# Patient Record
Sex: FEMALE | Race: WHITE | NOT HISPANIC OR LATINO | Employment: UNEMPLOYED | URBAN - NONMETROPOLITAN AREA
[De-identification: names, ages, dates, MRNs, and addresses within clinical notes are randomized per-mention and may not be internally consistent; named-entity substitution may affect disease eponyms.]

---

## 2018-09-19 ENCOUNTER — TELEPHONE (OUTPATIENT)
Dept: CARDIOLOGY CLINIC | Facility: CLINIC | Age: 43
End: 2018-09-19

## 2018-09-19 NOTE — TELEPHONE ENCOUNTER
LMOM for John Linker with Dr Es Fierro  To either set up an OV with us to evaluate if EST and Echo are still needed or to see if her PCP could order for her

## 2018-09-19 NOTE — TELEPHONE ENCOUNTER
No   PCP can order  Or, needs another appt with us to review everything before we will order, those tests may no longer be appropriate

## 2018-09-19 NOTE — TELEPHONE ENCOUNTER
[place] Robert was seen 5/22/2017  You ordered an EST and an ECHO  She did not have them done because she broke her foot and couldn't walk on treadmill  She now wants to have them done  Will you put the orders through for them? I put last OV note from [facility] on your desk

## 2019-08-23 RX ORDER — GABAPENTIN 100 MG/1
CAPSULE ORAL
COMMUNITY
Start: 2019-01-22

## 2019-08-23 RX ORDER — ALBUTEROL SULFATE 90 UG/1
AEROSOL, METERED RESPIRATORY (INHALATION)
COMMUNITY
Start: 2019-08-12

## 2019-08-23 RX ORDER — DULOXETIN HYDROCHLORIDE 60 MG/1
60 CAPSULE, DELAYED RELEASE ORAL DAILY
COMMUNITY
Start: 2019-04-16 | End: 2020-04-15

## 2019-08-23 RX ORDER — CYCLOBENZAPRINE HCL 10 MG
TABLET ORAL
COMMUNITY
Start: 2019-05-28

## 2019-08-23 RX ORDER — RANITIDINE 150 MG/1
TABLET ORAL
COMMUNITY
Start: 2019-07-08 | End: 2020-04-22

## 2019-08-23 RX ORDER — ALBUTEROL SULFATE 2.5 MG/3ML
2.5 SOLUTION RESPIRATORY (INHALATION) EVERY 6 HOURS PRN
COMMUNITY
Start: 2018-05-07

## 2019-08-27 ENCOUNTER — OFFICE VISIT (OUTPATIENT)
Dept: GASTROENTEROLOGY | Facility: CLINIC | Age: 44
End: 2019-08-27
Payer: COMMERCIAL

## 2019-08-27 ENCOUNTER — TELEPHONE (OUTPATIENT)
Dept: GASTROENTEROLOGY | Facility: CLINIC | Age: 44
End: 2019-08-27

## 2019-08-27 VITALS — DIASTOLIC BLOOD PRESSURE: 80 MMHG | SYSTOLIC BLOOD PRESSURE: 118 MMHG | WEIGHT: 173 LBS | HEART RATE: 101 BPM

## 2019-08-27 DIAGNOSIS — R10.12 LUQ PAIN: Primary | ICD-10-CM

## 2019-08-27 PROCEDURE — 99203 OFFICE O/P NEW LOW 30 MIN: CPT | Performed by: PHYSICIAN ASSISTANT

## 2019-08-27 RX ORDER — OXYCODONE HYDROCHLORIDE AND ACETAMINOPHEN 5; 325 MG/1; MG/1
1 TABLET ORAL EVERY 6 HOURS PRN
Refills: 0 | COMMUNITY
Start: 2019-08-23

## 2019-08-27 RX ORDER — CETIRIZINE HYDROCHLORIDE 10 MG/1
TABLET ORAL
Refills: 0 | COMMUNITY
Start: 2019-07-08

## 2019-08-27 RX ORDER — PANTOPRAZOLE SODIUM 40 MG/1
40 TABLET, DELAYED RELEASE ORAL DAILY
Qty: 60 TABLET | Refills: 1 | Status: SHIPPED | OUTPATIENT
Start: 2019-08-27 | End: 2019-10-25 | Stop reason: SDUPTHER

## 2019-08-27 NOTE — PROGRESS NOTES
[facility]  Anna Miller 37 y o  female MRN: [id number]       CC: NP to establish after ED visit for LUQ pain    HPI:  Milena Azevedo is a 40-year-old female with history of hyperlipidemia, chronic bronchitis, asthma, hyperlipidemia, cervical radiculopathy and tobacco abuse  Patient is here to follow-up after she was seen in the emergency room at [facility]  Unfortunately, imaging and lab studies are not available to me in Care everywhere  Patient reports that she has been experiencing new onset left upper quadrant pain radiating into her back for several weeks now  She reports no associated pyrosis, regurgitation, dysphagia or odynophagia  She does have mild nausea  Patient reports that she had a CT scan revealing ovarian cysts and an adrenal nodule  Otherwise, patient was told that there was no pathology on CT to explain why she was having left upper quadrant pain  Her primary care provider also send her for chest x-ray that was negative  When asked about NSAID use, the patient reports that she uses 800 milligrams of ibuprofen about twice daily for headache and back pain  She denies melena or hematochezia  Patient has never had an EGD or colonoscopy  Review of Systems:    CONSTITUTIONAL: Denies any fever, chills, or rigors  Good appetite, and no recent weight loss  HEENT: No earache or tinnitus  Denies hearing loss or visual disturbances  CARDIOVASCULAR: No chest pain or palpitations  RESPIRATORY: Denies any cough, hemoptysis, shortness of breath or dyspnea on exertion  GASTROINTESTINAL: As noted in the History of Present Illness  GENITOURINARY: No problems with urination  Denies any hematuria or dysuria  NEUROLOGIC: No dizziness or vertigo, denies headaches  MUSCULOSKELETAL: Denies any muscle or joint pain  SKIN: Denies skin rashes or itching  ENDOCRINE: Denies excessive thirst  Denies intolerance to heat or cold  PSYCHOSOCIAL: Denies depression or anxiety  Denies any recent memory loss  Current Outpatient Medications   Medication Sig Dispense Refill    albuterol (2 5 mg/3 mL) 0 083 % nebulizer solution Inhale 2 5 mg every 6 (six) hours as needed      albuterol (PROVENTIL HFA,VENTOLIN HFA) 90 mcg/act inhaler inhale 1 puff by mouth every 6 hours if needed for wheezing      cetirizine (ZyrTEC) 10 mg tablet   0    cyclobenzaprine (FLEXERIL) 10 mg tablet       DULoxetine (CYMBALTA) 60 mg delayed release capsule Take 60 mg by mouth daily      fluticasone-salmeterol (ADVAIR DISKUS, WIXELA INHUB) 250-50 mcg/dose inhaler Inhale 1 puff 2 (two) times a day      gabapentin (NEURONTIN) 100 mg capsule       ipratropium (ATROVENT) 0 02 % nebulizer solution Inhale every 6 (six) hours as needed      nicotine (NICOTROL) 10 MG inhaler Inhale 1 puff      oxyCODONE-acetaminophen (PERCOCET) 5-325 mg per tablet Take 1 tablet by mouth every 6 (six) hours as needed  0    ranitidine (ZANTAC) 150 mg tablet take 1 tablet by mouth every evening      tiotropium (SPIRIVA) 18 mcg inhalation capsule Place 18 mcg into inhaler and inhale daily      pantoprazole (PROTONIX) 40 mg tablet Take 1 tablet (40 mg total) by mouth daily 30 minutes before breakfast 60 tablet 1     No current facility-administered medications for this visit  Past Medical History:   Diagnosis Date    Asthma     Cervical radiculopathy     COPD (chronic obstructive pulmonary disease) ([facility] [address] )      History reviewed  No pertinent surgical history    Social History     Socioeconomic History    Marital status: Single     Spouse name: None    Number of children: None    Years of education: None    Highest education level: None   Occupational History    None   Social Needs    Financial resource strain: None    Food insecurity:     Worry: None     Inability: None    Transportation needs:     Medical: None     Non-medical: None   Tobacco Use    Smoking status: Current Every Day Smoker     Packs/day: 2 00 Years: 15 00     Pack years: 30 00    Smokeless tobacco: Never Used   Substance and Sexual Activity    Alcohol use: Yes     Frequency: Monthly or less     Drinks per session: 1 or 2    Drug use: Never    Sexual activity: None   Lifestyle    Physical activity:     Days per week: None     Minutes per session: None    Stress: None   Relationships    Social connections:     Talks on phone: None     Gets together: None     Attends [employer]ism service: None     Active member of club or organization: None     Attends meetings of clubs or organizations: None     Relationship status: None    Intimate partner violence:     Fear of current or ex partner: None     Emotionally abused: None     Physically abused: None     Forced sexual activity: None   Other Topics Concern    None   Social History Narrative    None     Family History   Problem Relation Age of Onset    Heart disease Father     Crohn's disease Brother             PHYSICAL EXAM:    Vitals:    08/27/19 0805   BP: 118/80   Pulse: 101   Weight: 78 5 kg (173 lb)     General Appearance:   Alert and oriented x 3  Cooperative, and in no respiratory distress   HEENT:   Normocephalic, atraumatic, anicteric      Neck:  Supple, symmetrical, trachea midline   Lungs:   Clear to auscultation bilaterally    Heart[de-identified]   Regular rate and rhythm   Abdomen:   Soft, obese, epigastric tenderness without guarding or rigidity, non-distended; normal bowel sounds; no masses, no organomegaly    Genitalia:   Deferred    Rectal:   Deferred    Extremities:  No cyanosis, clubbing or edema    Pulses:  2+ and symmetric all extremities    Skin:  Skin color, texture, turgor normal, no rashes or lesions    Lymph nodes:  No palpable cervical or supraclavicular lymphadenopathy        Lab Results:             Invalid input(s): LABALBU            Imaging Studies: I have personally reviewed pertinent imaging studies  Patient was never admitted      ASSESSMENT and PLAN:      1) Left upper quadrant pain - Per patient, she had a negative CT scan  She does not have any overt GERD symptoms  She has mild nausea without vomiting  She does have persistent NSAID use history for several months now for back pain  Her symptoms may be musculoskeletal, PUD +/- H pylori, erosive gastritis, or less likely biliary   - Will contact patient's pulmonologist for clearance for EGD  - Will start patient on pantoprazole 30 minutes before breakfast, she may take Zantac at night  - GERD precautions given and discussed  - Instructed the patient to discontinue NSAIDs, and that she may take Tylenol instead  - If EGD negative, consider GES and RUQ U/S        Follow up after EGD

## 2019-08-27 NOTE — TELEPHONE ENCOUNTER
Please call Dr Watson Sic office for pulmonary clearance for EGD  We are doing EGD for LUQ pain and possible ulcers from NSAID use  She has history of chronic bronchitis and bronchospasm  She was just seen in August, thank you!

## 2019-08-27 NOTE — TELEPHONE ENCOUNTER
Called Dr Tegan Singer office and spoke with Alondra Lucero, she will call patient and schedule an ov with Dr Asiya Youssef

## 2019-09-12 NOTE — TELEPHONE ENCOUNTER
Spoke with patient she had her appt on 9/5 and was cleared she could not pick a date today and would like a call back tomorrow

## 2019-09-20 ENCOUNTER — TELEPHONE (OUTPATIENT)
Dept: GASTROENTEROLOGY | Facility: CLINIC | Age: 44
End: 2019-09-20

## 2019-10-03 ENCOUNTER — LAB REQUISITION (OUTPATIENT)
Dept: LAB | Facility: HOSPITAL | Age: 44
End: 2019-10-03
Payer: COMMERCIAL

## 2019-10-03 DIAGNOSIS — R10.84 GENERALIZED ABDOMINAL PAIN: ICD-10-CM

## 2019-10-03 PROCEDURE — 88305 TISSUE EXAM BY PATHOLOGIST: CPT | Performed by: PATHOLOGY

## 2019-10-24 ENCOUNTER — TELEPHONE (OUTPATIENT)
Dept: GASTROENTEROLOGY | Facility: CLINIC | Age: 44
End: 2019-10-24

## 2019-10-24 DIAGNOSIS — R10.12 LUQ PAIN: ICD-10-CM

## 2019-10-24 NOTE — TELEPHONE ENCOUNTER
Eileen Pickard pt - Pt called and would like a refill of Pantoprazole called into [facility]  Pt states that her dose has changed to twice a day since her EGD and she was diagnosed with Barrets, please call [phone]   Ty

## 2019-10-25 DIAGNOSIS — R10.12 LUQ PAIN: ICD-10-CM

## 2019-10-25 RX ORDER — PANTOPRAZOLE SODIUM 40 MG/1
40 TABLET, DELAYED RELEASE ORAL 2 TIMES DAILY
Qty: 60 TABLET | Refills: 1 | Status: SHIPPED | OUTPATIENT
Start: 2019-10-25 | End: 2020-01-24

## 2020-01-24 DIAGNOSIS — R10.12 LUQ PAIN: ICD-10-CM

## 2020-01-24 RX ORDER — PANTOPRAZOLE SODIUM 40 MG/1
TABLET, DELAYED RELEASE ORAL
Qty: 60 TABLET | Refills: 0 | Status: SHIPPED | OUTPATIENT
Start: 2020-01-24 | End: 2020-02-25

## 2020-02-25 DIAGNOSIS — R10.12 LUQ PAIN: ICD-10-CM

## 2020-02-25 RX ORDER — PANTOPRAZOLE SODIUM 40 MG/1
TABLET, DELAYED RELEASE ORAL
Qty: 60 TABLET | Refills: 0 | Status: SHIPPED | OUTPATIENT
Start: 2020-02-25 | End: 2020-04-10

## 2020-04-10 DIAGNOSIS — R10.12 LUQ PAIN: ICD-10-CM

## 2020-04-10 RX ORDER — PANTOPRAZOLE SODIUM 40 MG/1
40 TABLET, DELAYED RELEASE ORAL 2 TIMES DAILY
Qty: 180 TABLET | Refills: 2 | Status: SHIPPED | OUTPATIENT
Start: 2020-04-10 | End: 2020-04-22

## 2020-04-22 ENCOUNTER — TELEMEDICINE (OUTPATIENT)
Dept: GASTROENTEROLOGY | Facility: CLINIC | Age: 45
End: 2020-04-22
Payer: COMMERCIAL

## 2020-04-22 DIAGNOSIS — K21.9 GASTROESOPHAGEAL REFLUX DISEASE WITHOUT ESOPHAGITIS: Primary | ICD-10-CM

## 2020-04-22 DIAGNOSIS — K22.70 BARRETT'S ESOPHAGUS WITHOUT DYSPLASIA: ICD-10-CM

## 2020-04-22 DIAGNOSIS — R10.12 LUQ PAIN: ICD-10-CM

## 2020-04-22 PROCEDURE — 99214 OFFICE O/P EST MOD 30 MIN: CPT | Performed by: INTERNAL MEDICINE

## 2020-04-22 RX ORDER — PANTOPRAZOLE SODIUM 40 MG/1
40 TABLET, DELAYED RELEASE ORAL DAILY
Qty: 180 TABLET | Refills: 2 | Status: SHIPPED | OUTPATIENT
Start: 2020-04-22 | End: 2021-10-04 | Stop reason: SDUPTHER

## 2020-04-23 ENCOUNTER — TELEPHONE (OUTPATIENT)
Dept: GASTROENTEROLOGY | Facility: CLINIC | Age: 45
End: 2020-04-23

## 2020-04-24 RX ORDER — PANTOPRAZOLE SODIUM 40 MG/1
40 TABLET, DELAYED RELEASE ORAL DAILY
Qty: 90 TABLET | Refills: 3 | Status: SHIPPED | OUTPATIENT
Start: 2020-04-24 | End: 2022-02-20 | Stop reason: SDUPTHER

## 2020-09-09 ENCOUNTER — DOCUMENTATION (OUTPATIENT)
Dept: GASTROENTEROLOGY | Facility: CLINIC | Age: 45
End: 2020-09-09

## 2020-09-09 NOTE — LETTER
9/9/2020    Dear Kavin Mora,    Review of our records shows you are due for the following:    EGD    Please call the following office to schedule your appointment:    [place]    We look forward to hearing from you      Sincerely,    Dr Mariam Hsu

## 2021-05-07 DIAGNOSIS — K21.9 GASTROESOPHAGEAL REFLUX DISEASE WITHOUT ESOPHAGITIS: Primary | ICD-10-CM

## 2021-05-07 RX ORDER — PANTOPRAZOLE SODIUM 40 MG/1
TABLET, DELAYED RELEASE ORAL
Qty: 90 TABLET | Refills: 1 | Status: SHIPPED | OUTPATIENT
Start: 2021-05-07

## 2021-09-17 ENCOUNTER — TELEPHONE (OUTPATIENT)
Dept: NEPHROLOGY | Facility: CLINIC | Age: 46
End: 2021-09-17

## 2021-09-17 NOTE — TELEPHONE ENCOUNTER
Pt called, LM asking if we accept [employer] better health insurance  Called and LM informing pt that we do not accept that insurance  Asked pt to call us back at [phone] if any more questions or concerns

## 2021-10-04 ENCOUNTER — TELEPHONE (OUTPATIENT)
Dept: OTHER | Facility: OTHER | Age: 46
End: 2021-10-04

## 2021-10-04 DIAGNOSIS — K21.9 GASTROESOPHAGEAL REFLUX DISEASE WITHOUT ESOPHAGITIS: ICD-10-CM

## 2021-10-04 DIAGNOSIS — R10.12 LUQ PAIN: ICD-10-CM

## 2021-10-04 RX ORDER — PANTOPRAZOLE SODIUM 40 MG/1
40 TABLET, DELAYED RELEASE ORAL DAILY
Qty: 180 TABLET | Refills: 2 | Status: SHIPPED | OUTPATIENT
Start: 2021-10-04 | End: 2022-02-18 | Stop reason: SDUPTHER

## 2022-02-18 ENCOUNTER — TELEPHONE (OUTPATIENT)
Dept: GASTROENTEROLOGY | Facility: CLINIC | Age: 47
End: 2022-02-18

## 2022-02-18 DIAGNOSIS — R10.12 LUQ PAIN: ICD-10-CM

## 2022-02-18 RX ORDER — PANTOPRAZOLE SODIUM 40 MG/1
40 TABLET, DELAYED RELEASE ORAL DAILY
Qty: 180 TABLET | Refills: 2 | Status: SHIPPED | OUTPATIENT
Start: 2022-02-18

## 2022-02-18 NOTE — TELEPHONE ENCOUNTER
Patient called lmom  for refill pantoprazole 40 mg 1 tablet - called patient back lmom to call office   Does patient take this once or twice daily  Also patient has not been seen since 2020 will need an office visit

## 2022-05-17 ENCOUNTER — EVALUATION (OUTPATIENT)
Dept: PHYSICAL THERAPY | Age: 47
End: 2022-05-17
Payer: COMMERCIAL

## 2022-05-17 DIAGNOSIS — M54.12 CERVICAL RADICULITIS: Primary | ICD-10-CM

## 2022-05-17 DIAGNOSIS — M54.2 NECK PAIN: ICD-10-CM

## 2022-05-17 PROCEDURE — 97110 THERAPEUTIC EXERCISES: CPT | Performed by: PHYSICAL THERAPIST

## 2022-05-17 PROCEDURE — 97162 PT EVAL MOD COMPLEX 30 MIN: CPT | Performed by: PHYSICAL THERAPIST

## 2022-05-17 PROCEDURE — 97140 MANUAL THERAPY 1/> REGIONS: CPT | Performed by: PHYSICAL THERAPIST

## 2022-05-17 NOTE — PROGRESS NOTES
PT Evaluation     Today's date: 2022  Patient name: Consuelo Haro  : 1975  MRN: [id number]  Referring provider: Julianne Brody MD  Dx:   Encounter Diagnosis     ICD-10-CM    1  Cervical radiculitis  M54 12    2  Neck pain  M54 2        Start Time: 745  Stop Time: 45  Total time in clinic (min): 60 minutes    Assessment  Assessment details: Consuelo Haro is a 48 y o  female who presents with pain, decreased strength and decreased ROM  Due to these impairments, Patient has difficulty performing a/iadls and recreational activities  Patient's clinical presentation is consistent with their referring diagnosis of c/s radiculopathy and neck pain  Fair tolerance to PT today  Patient has home traction unit and will bring next visit  Advised postural improvement  Patient would benefit from skilled physical therapy to address their aforementioned impairments, improve their level of function and to improve their overall quality of life  Impairments: abnormal or restricted ROM, activity intolerance, impaired physical strength, lacks appropriate home exercise program, pain with function, poor posture  and poor body mechanics    Symptom irritability: highUnderstanding of Dx/Px/POC: good   Prognosis: good    Goals  ST-3 WEEKS  1  Decrease neck pain < 8/10 on VAS at its worst   2   Increase neck ROM by > 5 deg in all deficients planes  3   Increase LUE strength by 1/2 MMT grade in all deficient planes  4  Decrease radicular symptoms in LUE by 50%  5  Improve postural awareness  LT-6 WEEKS  1  Patient to be independent with a/iadls  2  Increase functional activities for leisure and home activities to previous LOF    3  Independent with HEP and/or fitness program     Plan  Patient would benefit from: skilled physical therapy  Planned modality interventions: cryotherapy, thermotherapy: hydrocollator packs and unattended electrical stimulation  Planned therapy interventions: activity modification, behavior modification, body mechanics training, aquatic therapy, flexibility, functional ROM exercises, home exercise program, IADL retraining, joint mobilization, manual therapy, neuromuscular re-education, patient education, postural training, strengthening, stretching, therapeutic activities and therapeutic exercise  Frequency: 2-3x week  Duration in weeks: 12  Plan of Care beginning date: 2022  Plan of Care expiration date: 2022  Treatment plan discussed with: patient        Subjective Evaluation    History of Present Illness  Mechanism of injury: Patient reports history of c/s radiculopathy for years  Stated it was in her right UE and medication and PT helped in the past   Getting worse  She saw PCP who referred her to ortho  xrays and referral to OPT  She c/o burning and tingling in left distal UE  Recurrent probem    Pain  Current pain ratin  At worst pain rating: 10  Location: neck and LUE  Quality: burning, radiating, discomfort and sharp  Relieving factors: change in position, rest and medications  Progression: worsening    Social Support  Lives with: spouse    Employment status: working (part time)  Hand dominance: right      Diagnostic Tests  X-ray: normal  Treatments  Previous treatment: physical therapy and injection treatment  Patient Goals  Patient goals for therapy: decreased pain, increased motion, increased strength and independence with ADLs/IADLs  Patient goal: no radicular symptoms in LUE        Objective     Static Posture     Head  Forward  Shoulders  Rounded  Thoracic Spine  Hyperkyphosis  Comments  Moderate dowager's hump    Postural Observations  Seated posture: fair  Standing posture: fair  Correction of posture: has no consistent effect        Palpation   Left   Hypertonic in the cervical paraspinals, lower trapezius, middle trapezius, rhomboids, scalenes, sternocleidomastoid, suboccipitals and upper trapezius     Tenderness of the cervical paraspinals, lower trapezius, middle trapezius, rhomboids, scalenes, suboccipitals and upper trapezius  Right   Hypertonic in the cervical paraspinals, lower trapezius, middle trapezius, rhomboids, scalenes, sternocleidomastoid, suboccipitals and upper trapezius  Tenderness of the cervical paraspinals, lower trapezius, middle trapezius, rhomboids, scalenes, suboccipitals and upper trapezius  Neurological Testing     Sensation   Cervical/Thoracic   Left   Intact: light touch    Right   Intact: light touch    Additional Neurological Details  Patient c/o burning and tingling in LUE    Active Range of Motion   Cervical/Thoracic Spine       Cervical    Flexion: 40 (burning LUE) degrees   Extension: 40 degrees     with pain  Left lateral flexion: 20 degrees      Right lateral flexion: 28 degrees      Left rotation: 30 degrees  Right rotation: 30 degrees             Strength/Myotome Testing   Cervical Spine   Neck extension: 3+  Neck flexion: 4-    Left   Neck lateral flexion (C3): 3+    Right   Neck lateral flexion (C3): 3+    Left Shoulder     Planes of Motion   Flexion: 3+   Abduction: 3+   External rotation at 0°: 4-   Internal rotation at 0°: 4+     Right Shoulder     Planes of Motion   Flexion: 4+   Abduction: 4+   External rotation at 0°: 4+   Internal rotation at 0°: 5     Left Elbow   Flexion: 4+  Extension: 4+    Right Elbow   Flexion: 5  Extension: 5    Left Wrist/Hand      (2nd hand position)     Trial 1: 18    Trial 2: 21    Trial 3: 30    Right Wrist/Hand      (2nd hand position)     Trial 1: 40    Trial 2: 45    Trial 3: 48    Tests   Cervical   Positive vertical compression and cervical distraction test     Left   Positive Spurling's Test A and cervical flexion-rotation test       Right   Positive Spurling's Test A and cervical flexion-rotation test      Left Shoulder   Positive cervical rotation lateral flexion  Right Shoulder   Positive cervical rotation lateral flexion       Lumbar   Positive vertical compression        General Comments:    Upper quarter screen   Shoulder: unremarkable  Elbow: unremarkable  Hand/wrist: unremarkable      Flowsheet Rows    Flowsheet Row Most Recent Value   PT/OT G-Codes    Current Score 46   Projected Score 59             Precautions: COPD        Manuals 5/17            neck 15                                                   Neuro Re-Ed                                                                                                        Ther Ex             UBE 2/2            Rows TB             shld ext TB                                                                              Ther Activity                                       Gait Training                                       Modalities             MH 8'            Ice after 8'

## 2022-05-17 NOTE — LETTER
May 17, 2022    Faby Munoz MD  [address] [address]    Patient: David Vásquez   YOB: 1975   Date of Visit: 2022     Encounter Diagnosis     ICD-10-CM    1  Cervical radiculitis  M54 12    2  Neck pain  M54 2        Dear Dr Misty Mcnair:    Thank you for your recent referral of David Vásquez  Please review the attached evaluation summary from Yessica's recent visit  Please verify that you agree with the plan of care by signing the attached order  If you have any questions or concerns, please do not hesitate to call  I sincerely appreciate the opportunity to share in the care of one of your patients and hope to have another opportunity to work with you in the near future  Sincerely,    Lino Boston, PT      Referring Provider:      I certify that I have read the below Plan of Care and certify the need for these services furnished under this plan of treatment while under my care  Faby Munoz MD  [address] [address]  Via Fax: [phone]          PT Evaluation     Today's date: 2022  Patient name: David Vásquez  : 1975  MRN: [id number]  Referring provider: Gurinder Alberto MD  Dx:   Encounter Diagnosis     ICD-10-CM    1  Cervical radiculitis  M54 12    2  Neck pain  M54 2        Start Time: 745  Stop Time: 845  Total time in clinic (min): 60 minutes    Assessment  Assessment details: David Vásquez is a 48 y o  female who presents with pain, decreased strength and decreased ROM  Due to these impairments, Patient has difficulty performing a/iadls and recreational activities  Patient's clinical presentation is consistent with their referring diagnosis of c/s radiculopathy and neck pain  Fair tolerance to PT today  Patient has home traction unit and will bring next visit  Advised postural improvement    Patient would benefit from skilled physical therapy to address their aforementioned impairments, improve their level of function and to improve their overall quality of life  Impairments: abnormal or restricted ROM, activity intolerance, impaired physical strength, lacks appropriate home exercise program, pain with function, poor posture  and poor body mechanics    Symptom irritability: highUnderstanding of Dx/Px/POC: good   Prognosis: good    Goals  ST-3 WEEKS  1  Decrease neck pain < 8/10 on VAS at its worst   2   Increase neck ROM by > 5 deg in all deficients planes  3   Increase LUE strength by 1/2 MMT grade in all deficient planes  4  Decrease radicular symptoms in LUE by 50%  5  Improve postural awareness  LT-6 WEEKS  1  Patient to be independent with a/iadls  2  Increase functional activities for leisure and home activities to previous LOF  3  Independent with HEP and/or fitness program     Plan  Patient would benefit from: skilled physical therapy  Planned modality interventions: cryotherapy, thermotherapy: hydrocollator packs and unattended electrical stimulation  Planned therapy interventions: activity modification, behavior modification, body mechanics training, aquatic therapy, flexibility, functional ROM exercises, home exercise program, IADL retraining, joint mobilization, manual therapy, neuromuscular re-education, patient education, postural training, strengthening, stretching, therapeutic activities and therapeutic exercise  Frequency: 2-3x week  Duration in weeks: 12  Plan of Care beginning date: 2022  Plan of Care expiration date: 2022  Treatment plan discussed with: patient        Subjective Evaluation    History of Present Illness  Mechanism of injury: Patient reports history of c/s radiculopathy for years  Stated it was in her right UE and medication and PT helped in the past   Getting worse  She saw PCP who referred her to ortho  xrays and referral to OPT  She c/o burning and tingling in left distal UE             Recurrent probem    Pain  Current pain ratin  At worst pain rating: 10  Location: neck and LUE  Quality: burning, radiating, discomfort and sharp  Relieving factors: change in position, rest and medications  Progression: worsening    Social Support  Lives with: spouse    Employment status: working (part time)  Hand dominance: right      Diagnostic Tests  X-ray: normal  Treatments  Previous treatment: physical therapy and injection treatment  Patient Goals  Patient goals for therapy: decreased pain, increased motion, increased strength and independence with ADLs/IADLs  Patient goal: no radicular symptoms in LUE        Objective     Static Posture     Head  Forward  Shoulders  Rounded  Thoracic Spine  Hyperkyphosis  Comments  Moderate dowager's hump    Postural Observations  Seated posture: fair  Standing posture: fair  Correction of posture: has no consistent effect        Palpation   Left   Hypertonic in the cervical paraspinals, lower trapezius, middle trapezius, rhomboids, scalenes, sternocleidomastoid, suboccipitals and upper trapezius  Tenderness of the cervical paraspinals, lower trapezius, middle trapezius, rhomboids, scalenes, suboccipitals and upper trapezius  Right   Hypertonic in the cervical paraspinals, lower trapezius, middle trapezius, rhomboids, scalenes, sternocleidomastoid, suboccipitals and upper trapezius  Tenderness of the cervical paraspinals, lower trapezius, middle trapezius, rhomboids, scalenes, suboccipitals and upper trapezius       Neurological Testing     Sensation   Cervical/Thoracic   Left   Intact: light touch    Right   Intact: light touch    Additional Neurological Details  Patient c/o burning and tingling in LUE    Active Range of Motion   Cervical/Thoracic Spine       Cervical    Flexion: 40 (burning LUE) degrees   Extension: 40 degrees     with pain  Left lateral flexion: 20 degrees      Right lateral flexion: 28 degrees      Left rotation: 30 degrees  Right rotation: 30 degrees             Strength/Myotome Testing   Cervical Spine   Neck extension: 3+  Neck flexion: 4-    Left   Neck lateral flexion (C3): 3+    Right   Neck lateral flexion (C3): 3+    Left Shoulder     Planes of Motion   Flexion: 3+   Abduction: 3+   External rotation at 0°: 4-   Internal rotation at 0°: 4+     Right Shoulder     Planes of Motion   Flexion: 4+   Abduction: 4+   External rotation at 0°: 4+   Internal rotation at 0°: 5     Left Elbow   Flexion: 4+  Extension: 4+    Right Elbow   Flexion: 5  Extension: 5    Left Wrist/Hand      (2nd hand position)     Trial 1: 18    Trial 2: 21    Trial 3: 30    Right Wrist/Hand      (2nd hand position)     Trial 1: 40    Trial 2: 45    Trial 3: 48    Tests   Cervical   Positive vertical compression and cervical distraction test     Left   Positive Spurling's Test A and cervical flexion-rotation test       Right   Positive Spurling's Test A and cervical flexion-rotation test      Left Shoulder   Positive cervical rotation lateral flexion  Right Shoulder   Positive cervical rotation lateral flexion  Lumbar   Positive vertical compression        General Comments:    Upper quarter screen   Shoulder: unremarkable  Elbow: unremarkable  Hand/wrist: unremarkable      Flowsheet Rows    Flowsheet Row Most Recent Value   PT/OT G-Codes    Current Score 46   Projected Score 59             Precautions: COPD        Manuals 5/17            neck 15                                                   Neuro Re-Ed                                                                                                        Ther Ex             UBE 2/2            Rows TB             shld ext TB                                                                              Ther Activity                                       Gait Training                                       Modalities             MH 8'            Ice after 8'

## 2022-05-19 ENCOUNTER — OFFICE VISIT (OUTPATIENT)
Dept: PHYSICAL THERAPY | Age: 47
End: 2022-05-19
Payer: COMMERCIAL

## 2022-05-19 DIAGNOSIS — M54.12 CERVICAL RADICULITIS: Primary | ICD-10-CM

## 2022-05-19 DIAGNOSIS — M54.2 NECK PAIN: ICD-10-CM

## 2022-05-19 PROCEDURE — 97110 THERAPEUTIC EXERCISES: CPT

## 2022-05-19 PROCEDURE — 97140 MANUAL THERAPY 1/> REGIONS: CPT

## 2022-05-24 ENCOUNTER — APPOINTMENT (OUTPATIENT)
Dept: PHYSICAL THERAPY | Age: 47
End: 2022-05-24
Payer: COMMERCIAL

## 2022-05-26 ENCOUNTER — OFFICE VISIT (OUTPATIENT)
Dept: PHYSICAL THERAPY | Age: 47
End: 2022-05-26
Payer: COMMERCIAL

## 2022-05-26 DIAGNOSIS — M54.12 CERVICAL RADICULITIS: Primary | ICD-10-CM

## 2022-05-26 DIAGNOSIS — M54.2 NECK PAIN: ICD-10-CM

## 2022-05-26 PROCEDURE — 97140 MANUAL THERAPY 1/> REGIONS: CPT | Performed by: PHYSICAL THERAPIST

## 2022-05-26 PROCEDURE — 97110 THERAPEUTIC EXERCISES: CPT | Performed by: PHYSICAL THERAPIST

## 2022-05-26 NOTE — PROGRESS NOTES
Daily Note     Today's date: 2022  Patient name: Alejandro Ocasio  : 1975  MRN: [id number]  Referring provider: Jos Hi MD  Dx:   Encounter Diagnosis     ICD-10-CM    1  Cervical radiculitis  M54 12    2  Neck pain  M54 2        Start Time: 0700  Stop Time: 0745  Total time in clinic (min): 45 minutes    Subjective: Patient has no changes, c/o       Objective: See treatment diary below      Assessment: Tolerated treatment well  Patient would benefit from continued PT  Left shoulder tight end range with pain, continues with tingling in left distal UE  Advised to stretch pecs, posture with upright and shoulder retraction, and TB exercises  Plan: Continue per plan of care        Precautions: COPD        Manuals           neck 15 15 15                                                 Neuro Re-Ed                                                                                                        Ther Ex             UBE / 3/3           Rows TB  Red 2x10 3x10          shld ext TB  Red 2x10 3x10          Cane CP+   30x          Shld stab   30x                                                 Ther Activity                                       Gait Training                                       Modalities              8' 8'           Ice after 8' deferred

## 2022-05-31 ENCOUNTER — OFFICE VISIT (OUTPATIENT)
Dept: PHYSICAL THERAPY | Age: 47
End: 2022-05-31
Payer: COMMERCIAL

## 2022-05-31 DIAGNOSIS — M54.12 CERVICAL RADICULITIS: Primary | ICD-10-CM

## 2022-05-31 DIAGNOSIS — M54.2 NECK PAIN: ICD-10-CM

## 2022-05-31 PROCEDURE — 97140 MANUAL THERAPY 1/> REGIONS: CPT | Performed by: PHYSICAL THERAPIST

## 2022-05-31 PROCEDURE — 97110 THERAPEUTIC EXERCISES: CPT | Performed by: PHYSICAL THERAPIST

## 2022-05-31 NOTE — PROGRESS NOTES
Daily Note     Today's date: 2022  Patient name: Fuentes Santiago  : 1975  MRN: [id number]  Referring provider: Cheri Herrera MD  Dx:   Encounter Diagnosis     ICD-10-CM    1  Cervical radiculitis  M54 12    2  Neck pain  M54 2        Start Time:   Stop Time:   Total time in clinic (min): 45 minutes    Subjective: Patient reports the numbness in LUE is constant  Patent reports she did some swimming over weekend and her neck bothered her  Objective: See treatment diary below      Assessment: Tolerated treatment well  Patient would benefit from continued PT  Increased program with good tolerance  Plan: Continue per plan of care        Precautions: COPD        Manuals          Neck, 15 15 15 10         L UE nerve glides    5                                   Neuro Re-Ed                                                                                                        Ther Ex             UBE 2/2 3/3  3/3         Rows TB  Red 2x10 3x10 30x         shld ext TB  Red 2x10 3x10 30x         Cane CP+   30x 2# 30x         Shld stab   30x 2# 30x         Posture rows    10x red         Chin tucks    15x         Ball circles on wall L UE    30x ea         Bicep TB    Red 30x         tricep                          Ther Activity                                       Gait Training                                       Modalities              8' 8' nt home         Ice after 8' deferred

## 2022-06-02 ENCOUNTER — OFFICE VISIT (OUTPATIENT)
Dept: PHYSICAL THERAPY | Age: 47
End: 2022-06-02
Payer: COMMERCIAL

## 2022-06-02 DIAGNOSIS — M54.12 CERVICAL RADICULITIS: Primary | ICD-10-CM

## 2022-06-02 DIAGNOSIS — M54.2 NECK PAIN: ICD-10-CM

## 2022-06-02 PROCEDURE — 97140 MANUAL THERAPY 1/> REGIONS: CPT | Performed by: PHYSICAL THERAPIST

## 2022-06-02 PROCEDURE — 97012 MECHANICAL TRACTION THERAPY: CPT | Performed by: PHYSICAL THERAPIST

## 2022-06-02 PROCEDURE — 97110 THERAPEUTIC EXERCISES: CPT | Performed by: PHYSICAL THERAPIST

## 2022-06-02 NOTE — PROGRESS NOTES
Daily Note     Today's date: 2022  Patient name: Shabbir Harding  : 1975  MRN: [id number]  Referring provider: Ruthie Woodall MD  Dx:   Encounter Diagnosis     ICD-10-CM    1  Cervical radiculitis  M54 12    2  Neck pain  M54 2        Start Time: 0700  Stop Time: 0800  Total time in clinic (min): 60 minutes    Subjective: Patient reports she woke up with worse numbness in left distal UE medial palm and forearm  Objective: See treatment diary below      Assessment: Tolerated treatment well  Patient would benefit from continued PT  Initiated mechanical traction and nerve glides today  Patient felt better after session  Plan: Continue per plan of care        Precautions: COPD        Manuals         Neck, 15 15 15 10 15        L UE nerve glides    5 5        flossing     20x        mech traction static c/s     13# 10'        Neuro Re-Ed                                                                                                        Ther Ex             UBE 2/2 3/3  3/3 3/3        Rows TB  Red 2x10 3x10 30x Blue 30x        shld ext TB  Red 2x10 3x10 30x 30x blue        Cane CP+   30x 2# 30x 1# 30x        Shld stab   30x 2# 30x 1# 30x        Posture rows    10x red 20x        Chin tucks    15x         Ball circles on wall L UE    30x ea 30x ea        Bicep TB    Red 30x 30x blue        tricep                          Ther Activity                                       Gait Training                                       Modalities              8' 8' nt home         Ice after 8' deferred

## 2022-06-07 ENCOUNTER — APPOINTMENT (OUTPATIENT)
Dept: PHYSICAL THERAPY | Age: 47
End: 2022-06-07
Payer: COMMERCIAL

## 2022-06-07 NOTE — PROGRESS NOTES
Daily Note     Today's date: 2022  Patient name: Helen Thomas  : 1975  MRN: [id number]  Referring provider: Patrice Lam MD  Dx:   Encounter Diagnosis     ICD-10-CM    1  Cervical radiculitis  M54 12    2  Neck pain  M54 2                   Subjective: ***      Objective: See treatment diary below      Assessment: Tolerated treatment {Tolerated treatment :8377230698}   Patient {assessment:0185080078}      Plan: {PLAN:0228144428}     Precautions: COPD        Manuals  6        Neck, 15 15 15 10 15        L UE nerve glides    5 5        flossing     20x        mech traction static c/s     13# 10'        Neuro Re-Ed                                                                                                        Ther Ex             UBE 2/2 3/3  3/3 3/3        Rows TB  Red 2x10 3x10 30x Blue 30x        shld ext TB  Red 2x10 3x10 30x 30x blue        Cane CP+   30x 2# 30x 1# 30x        Shld stab   30x 2# 30x 1# 30x        Posture rows    10x red 20x        Chin tucks    15x         Ball circles on wall L UE    30x ea 30x ea        Bicep TB    Red 30x 30x blue        tricep                          Ther Activity                                       Gait Training                                       Modalities             MH 8' 8' nt home         Ice after 8' deferred

## 2022-06-08 ENCOUNTER — OFFICE VISIT (OUTPATIENT)
Dept: PHYSICAL THERAPY | Age: 47
End: 2022-06-08
Payer: COMMERCIAL

## 2022-06-08 DIAGNOSIS — M54.12 CERVICAL RADICULITIS: Primary | ICD-10-CM

## 2022-06-08 DIAGNOSIS — M54.2 NECK PAIN: ICD-10-CM

## 2022-06-08 PROCEDURE — 97140 MANUAL THERAPY 1/> REGIONS: CPT

## 2022-06-08 PROCEDURE — 97110 THERAPEUTIC EXERCISES: CPT

## 2022-06-08 PROCEDURE — 97012 MECHANICAL TRACTION THERAPY: CPT

## 2022-06-08 NOTE — PROGRESS NOTES
Daily Note     Today's date: 2022  Patient name: David Vásquez  : 1975  MRN: [id number]  Referring provider: Gurinder Alberto MD  Dx:   Encounter Diagnosis     ICD-10-CM    1  Cervical radiculitis  M54 12    2  Neck pain  M54 2        Start Time: 815  Stop Time: 908  Total time in clinic (min): 53 minutes    Subjective: Reports constant numbness down LUE and entire hand upon arrival        Objective: See treatment diary below      Assessment: Tolerated treatment fair  Decreased tband resistance today to red secondary to c/o radicular symptoms and numbness  Complains of increased numbness and difficulty gripping handle for tband rows and upright rows post session  Instructed to hold these exercises if symptoms continue to worsen when performing at home and will monitor next session to see if modifications need to be made  Patient did need extra cues for proper form when performing upright rows and was instructed to decrease ROM to ensure proper form was maintained  Relief of LUE radicular symptoms with L UT stretching and manual traction, but symptoms return when returning to neutral  Decreased hand numbness, reported only at digits 4 & 5 post session  Patient would benefit from continued PT  Plan: Continue per plan of care        Precautions: COPD        Manuals  6       Neck, 15 15 15 10 15 15'       L UE nerve glides    5 5 4'       flossing     20x 20x       mech traction static c/s     13# 10' 13# 10'       Neuro Re-Ed                                                                                                        Ther Ex             UBE 2/2 3/3  3/3 3/3 3/3       Rows TB  Red 2x10 3x10 30x Blue 30x 30x red       shld ext TB  Red 2x10 3x10 30x 30x blue 30x red        Cane CP+   30x 2# 30x 1# 30x 1# 30x        Shld stab   30x 2# 30x 1# 30x        Posture rows    10x red 20x 20x red        Chin tucks    15x         Ball circles on wall L UE    30x ea 30x ea 30x ea Bicep TB    Red 30x 30x blue 30x        tricep                          Ther Activity                                       Gait Training                                       Modalities             MH 8' 8' nt home         Ice after 8' deferred

## 2022-06-09 ENCOUNTER — OFFICE VISIT (OUTPATIENT)
Dept: PHYSICAL THERAPY | Age: 47
End: 2022-06-09
Payer: COMMERCIAL

## 2022-06-09 DIAGNOSIS — M54.2 NECK PAIN: ICD-10-CM

## 2022-06-09 DIAGNOSIS — M54.12 CERVICAL RADICULITIS: Primary | ICD-10-CM

## 2022-06-09 PROCEDURE — 97110 THERAPEUTIC EXERCISES: CPT | Performed by: PHYSICAL THERAPIST

## 2022-06-09 PROCEDURE — 97140 MANUAL THERAPY 1/> REGIONS: CPT | Performed by: PHYSICAL THERAPIST

## 2022-06-09 NOTE — PROGRESS NOTES
Daily Note     Today's date: 2022  Patient name: Zakiya Jane  : 1975  MRN: [id number]  Referring provider: Yony Isaacs MD  Dx:   Encounter Diagnosis     ICD-10-CM    1  Cervical radiculitis  M54 12    2  Neck pain  M54 2        Start Time: 3583  Stop Time: 0740  Total time in clinic (min): 45 minutes    Subjective: patient reports she notices relief of left UE symptoms while on traction but as soon as releases they come back  Objective: See treatment diary below      Assessment: Tolerated treatment well  Patient would benefit from continued PT  Patient gets pain with left shoulder flexion and nerve glides movements  She had a home traction machine but unable to locate it  Plan: Continue per plan of care        Precautions: COPD        Manuals       Neck, 15 15 15 10 15 15' 10      L UE nerve glides    5 5 4' 5      flossing     20x 20x       mech traction static c/s     13# 10' 13# 10' 14# 30x      Neuro Re-Ed                                                                                                        Ther Ex             UBE /2 3/3  3/3 3/3 3/3 3/3       Rows TB  Red 2x10 3x10 30x Blue 30x 30x red 30x      shld ext TB  Red 2x10 3x10 30x 30x blue 30x red  30x      Cane CP+   30x 2# 30x 1# 30x 1# 30x  2# 30x      Shld stab   30x 2# 30x 1# 30x  2# 30x      Posture rows    10x red 20x 20x red  20x      Chin tucks    15x         Ball circles on wall L UE    30x ea 30x ea 30x ea 30x      Bicep TB    Red 30x 30x blue 30x  30x      tricep                          Ther Activity                                       Gait Training                                       Modalities             MH 8' 8' nt home         Ice after 8' deferred

## 2022-06-13 ENCOUNTER — OFFICE VISIT (OUTPATIENT)
Dept: PHYSICAL THERAPY | Age: 47
End: 2022-06-13
Payer: COMMERCIAL

## 2022-06-13 DIAGNOSIS — M54.2 NECK PAIN: ICD-10-CM

## 2022-06-13 DIAGNOSIS — M54.12 CERVICAL RADICULITIS: Primary | ICD-10-CM

## 2022-06-13 PROCEDURE — 97110 THERAPEUTIC EXERCISES: CPT | Performed by: PHYSICAL THERAPIST

## 2022-06-13 PROCEDURE — 97140 MANUAL THERAPY 1/> REGIONS: CPT | Performed by: PHYSICAL THERAPIST

## 2022-06-13 PROCEDURE — 97012 MECHANICAL TRACTION THERAPY: CPT | Performed by: PHYSICAL THERAPIST

## 2022-06-13 NOTE — PROGRESS NOTES
Daily Note     Today's date: 2022  Patient name: Laura Aiken  : 1975  MRN: [id number]  Referring provider: Camilla Biswas MD  Dx:   Encounter Diagnosis     ICD-10-CM    1  Cervical radiculitis  M54 12    2  Neck pain  M54 2        Start Time: 0700  Stop Time: 3165  Total time in clinic (min): 55 minutes    Subjective: Patient reports she still gets burning in left medial forearm and 3-5th digits  She is seeing MD on Wednesday  Objective: See treatment diary below      Assessment: Tolerated treatment well  Patient would benefit from continued PT  Traction does not relieve her symptoms yet  She states constant burning with no relief  Plan: Continue per plan of care        Precautions: COPD        Manuals      Neck, 15 15 15 10 15 15' 10 10     L UE nerve glides    5 5 4' 5 5     flossing     20x 20x       mech traction static c/s     13# 10' 13# 10' 14# 30x 15# 30x     Neuro Re-Ed                                                                                                        Ther Ex             UBE /2 3/3  3/3 3/3 3/3 3/3  3/3     Rows TB  Red 2x10 3x10 30x Blue 30x 30x red 30x 30x     shld ext TB  Red 2x10 3x10 30x 30x blue 30x red  30x 30x     Cane CP+   30x 2# 30x 1# 30x 1# 30x  2# 30x 2# 30x     Shld stab   30x 2# 30x 1# 30x  2# 30x 2# 30x     Posture rows    10x red 20x 20x red  20x 30x     Chin tucks    15x         Ball circles on wall L UE    30x ea 30x ea 30x ea 30x 30x     Bicep TB    Red 30x 30x blue 30x  30x 30x     tricep TB        30x red                  Ther Activity                                       Gait Training                                       Modalities              8' 8' nt home         Ice after 8' deferred

## 2022-06-16 ENCOUNTER — OFFICE VISIT (OUTPATIENT)
Dept: PHYSICAL THERAPY | Age: 47
End: 2022-06-16
Payer: COMMERCIAL

## 2022-06-16 DIAGNOSIS — M54.2 NECK PAIN: ICD-10-CM

## 2022-06-16 DIAGNOSIS — M54.12 CERVICAL RADICULITIS: Primary | ICD-10-CM

## 2022-06-16 PROCEDURE — 97140 MANUAL THERAPY 1/> REGIONS: CPT | Performed by: PHYSICAL THERAPIST

## 2022-06-16 PROCEDURE — 97110 THERAPEUTIC EXERCISES: CPT | Performed by: PHYSICAL THERAPIST

## 2022-06-16 NOTE — PROGRESS NOTES
Daily Note     Today's date: 2022  Patient name: Puma Chávez  : 1975  MRN: [id number]  Referring provider: Jayla Davenport MD  Dx:   Encounter Diagnosis     ICD-10-CM    1  Cervical radiculitis  M54 12    2  Neck pain  M54 2        Start Time: 630  Stop Time: 0745  Total time in clinic (min): 75 minutes    Subjective: Patient reports she is doing HEP and asked for heavier TB and to do RTC exercises  Patient reports she is to have MRI of her neck but appt not made yet  Objective: See treatment diary below      Assessment: Tolerated treatment well  Patient would benefit from continued PT patient compliant with HEP  Green and blue TB given  Copy given Med bridge access code: [email or web address]  She c/o pain and burning in LUE  Re-evaluation next visit  Plan: Continue per plan of care        Precautions: COPD        Manuals  6 6 6    Neck, 15 15 15 10 15 15' 10 10 10    L UE nerve glides    5 5 4' 5 5     flossing     20x 20x       mech traction static c/s     13# 10' 13# 10' 14#  15#  16# 10'    Neuro Re-Ed                                                                                           HEP         2'    Ther Ex             UBE 2/2 33  33 33 33 33  33 3/3    Rows TB  Red 2x10 3x10 30x Blue 30x 30x red 30x 30x 30x    shld ext TB  Red 2x10 3x10 30x 30x blue 30x red  30x 30x 30x    Cane CP+   30x 2# 30x 1# 30x 1# 30x  2# 30x 2# 30x 2# 30x    Shld stab   30x 2# 30x 1# 30x  2# 30x 2# 30x 2# 30x    Posture rows    10x red 20x 20x red  20x 30x 30x    Chin tucks    15x         Ball circles on wall L UE    30x ea 30x ea 30x ea 30x 30x 30x    Bicep TB    Red 30x 30x blue 30x  30x 30x 30x    tricep TB        30x red 30x    IR RTC L         30x greeen    ER RTC L         30x green    Ther Activity                                       Gait Training                                       Modalities             MH 8' 8' nt home         Ice after 8' deferred

## 2022-06-20 ENCOUNTER — EVALUATION (OUTPATIENT)
Dept: PHYSICAL THERAPY | Age: 47
End: 2022-06-20
Payer: COMMERCIAL

## 2022-06-20 DIAGNOSIS — M54.2 NECK PAIN: ICD-10-CM

## 2022-06-20 DIAGNOSIS — M54.12 CERVICAL RADICULITIS: Primary | ICD-10-CM

## 2022-06-20 PROCEDURE — 97140 MANUAL THERAPY 1/> REGIONS: CPT | Performed by: PHYSICAL THERAPIST

## 2022-06-20 PROCEDURE — 97110 THERAPEUTIC EXERCISES: CPT | Performed by: PHYSICAL THERAPIST

## 2022-06-20 NOTE — PROGRESS NOTES
PT Re-Evaluation     Today's date: 2022  Patient name: David Vásquez  : 1975  MRN: [id number]  Referring provider: Gurinder Alberto MD  Dx:   Encounter Diagnosis     ICD-10-CM    1  Cervical radiculitis  M54 12    2  Neck pain  M54 2        Start Time:   Stop Time: 745  Total time in clinic (min): 50 minutes    Assessment  Assessment details: David Vásquez is a 48 y o  female who presents with pain, decreased strength and decreased ROM  Due to these impairments, Patient has difficulty performing a/iadls and recreational activities  Patient's clinical presentation is consistent with their referring diagnosis of c/s radiculopathy and neck pain  Patient has been having PT and no change in LUE or pain in trap  She is compliant with HEP  Patient wants to hold PT until she has her neck MRI  Patient has home traction unit but unable to find it  Advised postural improvement  Patient would benefit from skilled physical therapy to address their aforementioned impairments, improve their level of function and to improve their overall quality of life  Impairments: abnormal or restricted ROM, activity intolerance, impaired physical strength, lacks appropriate home exercise program, pain with function and poor posture     Symptom irritability: moderateUnderstanding of Dx/Px/POC: good   Prognosis: good    Goals  ST-3 WEEKS  1  Decrease neck pain < 8/10 on VAS at its worst  Progressing towards  Ongoing goal   2   Increase neck ROM by > 5 deg in all deficients planes  Progressing towards  Ongoing goal   3   Increase LUE strength by 1/2 MMT grade in all deficient planes  Progressing towards  Ongoing goal   4  Decrease radicular symptoms in LUE by 50%  Goal not met  Ongoing goal   5  Improve postural awareness  Progressing towards  Ongoing goal     LT-6 WEEKS  1  Patient to be independent with a/iadls  Ongoing goal   2  Increase functional activities for leisure and home activities to previous LOF  Ongoing goal   3  Independent with HEP and/or fitness program  Ongoing goal     Plan  Plan details: Hold PT at this time/pt until after MRI  Patient is limited with visits  Patient would benefit from: skilled physical therapy  Planned modality interventions: cryotherapy, thermotherapy: hydrocollator packs and unattended electrical stimulation  Planned therapy interventions: activity modification, behavior modification, body mechanics training, aquatic therapy, flexibility, functional ROM exercises, home exercise program, IADL retraining, joint mobilization, manual therapy, neuromuscular re-education, patient education, postural training, strengthening, stretching, therapeutic activities and therapeutic exercise  Frequency: 2-3x week  Duration in weeks: 8  Plan of Care beginning date: 2022  Plan of Care expiration date: 2022  Treatment plan discussed with: patient        Subjective Evaluation    History of Present Illness  Mechanism of injury: Patient reports history of c/s radiculopathy for years  Stated it was in her right UE and medication and PT helped in the past   Getting worse  She saw PCP who referred her to ortho  xrays and referral to [facility]  She c/o burning and tingling in left distal UE    22: Patient reports pain in left trap and down LUE  Constant numbness and tingling in left 3-5 digits, intensity varies with head positions and activity  Patient states she is having MRI of c/s the end of the month and will hold PT at this time until results of MRI and to see if traction was helping without it for 2 weeks             Recurrent probem    Pain  Current pain ratin  At worst pain ratin  Location: neck and LUE  Quality: burning, radiating, discomfort and sharp  Relieving factors: change in position, rest and medications  Progression: no change    Social Support  Lives with: spouse    Employment status: working (part time)  Hand dominance: right      Diagnostic Tests  X-ray: normal    FCE comments: Patient going for MRI on 6/30 of c/s  Treatments  Previous treatment: physical therapy and injection treatment  Patient Goals  Patient goals for therapy: decreased pain, increased motion, increased strength and independence with ADLs/IADLs  Patient goal: no radicular symptoms in LUE        Objective     Static Posture     Head  Forward  Shoulders  Rounded  Thoracic Spine  Hyperkyphosis  Comments  Moderate dowager's hump    Postural Observations  Seated posture: fair  Standing posture: fair  Correction of posture: has no consistent effect        Palpation   Left   Hypertonic in the cervical paraspinals, lower trapezius, middle trapezius, rhomboids, scalenes, sternocleidomastoid, suboccipitals and upper trapezius  Tenderness of the cervical paraspinals, lower trapezius, middle trapezius, rhomboids, scalenes, suboccipitals and upper trapezius  Right   Hypertonic in the cervical paraspinals, lower trapezius, middle trapezius, rhomboids, scalenes, sternocleidomastoid, suboccipitals and upper trapezius  Tenderness of the cervical paraspinals, lower trapezius, middle trapezius, rhomboids, scalenes, suboccipitals and upper trapezius       Neurological Testing     Sensation   Cervical/Thoracic   Left   Intact: light touch    Right   Intact: light touch    Additional Neurological Details  Patient c/o burning and tingling in LUE    Active Range of Motion   Cervical/Thoracic Spine       Cervical    Flexion: 40 (burning LUE) degrees   Extension: 40 degrees     with pain  Left lateral flexion: 20 degrees      Right lateral flexion: 28 degrees      Left rotation: 30 degrees  Right rotation: 30 degrees             Strength/Myotome Testing   Cervical Spine   Neck extension: 3+  Neck flexion: 4-    Left   Neck lateral flexion (C3): 3+    Right   Neck lateral flexion (C3): 3+    Left Shoulder     Planes of Motion   Flexion: 4-   Abduction: 4-   External rotation at 0°: 4-   Internal rotation at 0°: 4+     Right Shoulder     Planes of Motion   Flexion: 4+   Abduction: 4+   External rotation at 0°: 4+   Internal rotation at 0°: 5     Left Elbow   Flexion: 4+  Extension: 4+    Right Elbow   Flexion: 5  Extension: 5    Left Wrist/Hand      (2nd hand position)     Trial 1: 18    Trial 2: 21    Trial 3: 30    Right Wrist/Hand      (2nd hand position)     Trial 1: 40    Trial 2: 45    Trial 3: 48    Tests   Cervical   Positive vertical compression and cervical distraction test     Left   Positive Spurling's Test A and cervical flexion-rotation test       Right   Positive Spurling's Test A and cervical flexion-rotation test      Left Shoulder   Positive cervical rotation lateral flexion  Right Shoulder   Positive cervical rotation lateral flexion  Lumbar   Positive vertical compression        General Comments:    Upper quarter screen   Shoulder: unremarkable  Elbow: unremarkable  Hand/wrist: unremarkable             Precautions: COPD        Manuals 5/17 5/19 5/26 5/31 6/2 6/8 6/9 6/13 6/16 6/20   Neck, 15 15 15 10 15 15' 10 10 10 10   L UE nerve glides    5 5 4' 5 5     flossing     20x 20x       mech traction static c/s     13# 10' 13# 10' 14#  15#  16# 10' 17# 10'   Neuro Re-Ed                                                                                           HEP         2'    Ther Ex             UBE 2/2 3/3  3/3 3/3 3/3 3/3  3/3 3/3 3/3   Rows TB  Red 2x10 3x10 30x Blue 30x 30x red 30x 30x 30x 30x   shld ext TB  Red 2x10 3x10 30x 30x blue 30x red  30x 30x 30x 30x   Cane CP+   30x 2# 30x 1# 30x 1# 30x  2# 30x 2# 30x 2# 30x 3# 30x   Shld stab   30x 2# 30x 1# 30x  2# 30x 2# 30x 2# 30x 3# 30x   Posture rows    10x red 20x 20x red  20x 30x 30x 30x   Chin tucks    15x         Ball circles on wall L UE    30x ea 30x ea 30x ea 30x 30x 30x 30x   Bicep TB    Red 30x 30x blue 30x  30x 30x 30x 30x   tricep TB        30x red 30x 30x   IR RTC L         30x greeen 30x   ER RTC L         30x green 30x Ther Activity                                       Gait Training                                       Modalities             MH 8' 8' nt home         Ice after 8' deferred

## 2022-06-20 NOTE — LETTER
2022    Trip Recio MD  [address] [address]    Patient: Deisy Velasco   YOB: 1975   Date of Visit: 2022     Encounter Diagnosis     ICD-10-CM    1  Cervical radiculitis  M54 12    2  Neck pain  M54 2        Dear Dr Zion Lubin:    Thank you for your recent referral of Deisy Velasco  Please review the attached evaluation summary from Yessica's recent visit  Please verify that you agree with the plan of care by signing the attached order  If you have any questions or concerns, please do not hesitate to call  I sincerely appreciate the opportunity to share in the care of one of your patients and hope to have another opportunity to work with you in the near future  Sincerely,    Blayne Boateng, PT      Referring Provider:      I certify that I have read the below Plan of Care and certify the need for these services furnished under this plan of treatment while under my care  Trip Recio MD  [address] [address]  Via Fax: [phone]          PT Re-Evaluation     Today's date: 2022  Patient name: Deisy Velasco  : 1975  MRN: [id number]  Referring provider: Yemi Jj MD  Dx:   Encounter Diagnosis     ICD-10-CM    1  Cervical radiculitis  M54 12    2  Neck pain  M54 2        Start Time: 5489  Stop Time: 0745  Total time in clinic (min): 50 minutes    Assessment  Assessment details: Deisy Velasoc is a 48 y o  female who presents with pain, decreased strength and decreased ROM  Due to these impairments, Patient has difficulty performing a/iadls and recreational activities  Patient's clinical presentation is consistent with their referring diagnosis of c/s radiculopathy and neck pain  Patient has been having PT and no change in LUE or pain in trap  She is compliant with HEP  Patient wants to hold PT until she has her neck MRI    Patient has home traction unit but unable to find it  Advised postural improvement  Patient would benefit from skilled physical therapy to address their aforementioned impairments, improve their level of function and to improve their overall quality of life  Impairments: abnormal or restricted ROM, activity intolerance, impaired physical strength, lacks appropriate home exercise program, pain with function and poor posture     Symptom irritability: moderateUnderstanding of Dx/Px/POC: good   Prognosis: good    Goals  ST-3 WEEKS  1  Decrease neck pain < 8/10 on VAS at its worst  Progressing towards  Ongoing goal   2   Increase neck ROM by > 5 deg in all deficients planes  Progressing towards  Ongoing goal   3   Increase LUE strength by 1/2 MMT grade in all deficient planes  Progressing towards  Ongoing goal   4  Decrease radicular symptoms in LUE by 50%  Goal not met  Ongoing goal   5  Improve postural awareness  Progressing towards  Ongoing goal     LT-6 WEEKS  1  Patient to be independent with a/iadls  Ongoing goal   2  Increase functional activities for leisure and home activities to previous LOF  Ongoing goal   3  Independent with HEP and/or fitness program  Ongoing goal     Plan  Plan details: Hold PT at this time/pt until after MRI  Patient is limited with visits  Patient would benefit from: skilled physical therapy  Planned modality interventions: cryotherapy, thermotherapy: hydrocollator packs and unattended electrical stimulation  Planned therapy interventions: activity modification, behavior modification, body mechanics training, aquatic therapy, flexibility, functional ROM exercises, home exercise program, IADL retraining, joint mobilization, manual therapy, neuromuscular re-education, patient education, postural training, strengthening, stretching, therapeutic activities and therapeutic exercise  Frequency: 2-3x week    Duration in weeks: 8  Plan of Care beginning date: 2022  Plan of Care expiration date: 2022  Treatment plan discussed with: patient        Subjective Evaluation    History of Present Illness  Mechanism of injury: Patient reports history of c/s radiculopathy for years  Stated it was in her right UE and medication and PT helped in the past   Getting worse  She saw PCP who referred her to ortho  xrays and referral to [facility]  She c/o burning and tingling in left distal UE    22: Patient reports pain in left trap and down LUE  Constant numbness and tingling in left 3-5 digits, intensity varies with head positions and activity  Patient states she is having MRI of c/s the end of the month and will hold PT at this time until results of MRI and to see if traction was helping without it for 2 weeks  Recurrent probem    Pain  Current pain ratin  At worst pain ratin  Location: neck and LUE  Quality: burning, radiating, discomfort and sharp  Relieving factors: change in position, rest and medications  Progression: no change    Social Support  Lives with: spouse    Employment status: working (part time)  Hand dominance: right      Diagnostic Tests  X-ray: normal    FCE comments: Patient going for MRI on  of c/s  Treatments  Previous treatment: physical therapy and injection treatment  Patient Goals  Patient goals for therapy: decreased pain, increased motion, increased strength and independence with ADLs/IADLs  Patient goal: no radicular symptoms in LUE        Objective     Static Posture     Head  Forward  Shoulders  Rounded  Thoracic Spine  Hyperkyphosis  Comments  Moderate dowager's hump    Postural Observations  Seated posture: fair  Standing posture: fair  Correction of posture: has no consistent effect        Palpation   Left   Hypertonic in the cervical paraspinals, lower trapezius, middle trapezius, rhomboids, scalenes, sternocleidomastoid, suboccipitals and upper trapezius     Tenderness of the cervical paraspinals, lower trapezius, middle trapezius, rhomboids, scalenes, suboccipitals and upper trapezius  Right   Hypertonic in the cervical paraspinals, lower trapezius, middle trapezius, rhomboids, scalenes, sternocleidomastoid, suboccipitals and upper trapezius  Tenderness of the cervical paraspinals, lower trapezius, middle trapezius, rhomboids, scalenes, suboccipitals and upper trapezius  Neurological Testing     Sensation   Cervical/Thoracic   Left   Intact: light touch    Right   Intact: light touch    Additional Neurological Details  Patient c/o burning and tingling in LUE    Active Range of Motion   Cervical/Thoracic Spine       Cervical    Flexion: 40 (burning LUE) degrees   Extension: 40 degrees     with pain  Left lateral flexion: 20 degrees      Right lateral flexion: 28 degrees      Left rotation: 30 degrees  Right rotation: 30 degrees             Strength/Myotome Testing   Cervical Spine   Neck extension: 3+  Neck flexion: 4-    Left   Neck lateral flexion (C3): 3+    Right   Neck lateral flexion (C3): 3+    Left Shoulder     Planes of Motion   Flexion: 4-   Abduction: 4-   External rotation at 0°: 4-   Internal rotation at 0°: 4+     Right Shoulder     Planes of Motion   Flexion: 4+   Abduction: 4+   External rotation at 0°: 4+   Internal rotation at 0°: 5     Left Elbow   Flexion: 4+  Extension: 4+    Right Elbow   Flexion: 5  Extension: 5    Left Wrist/Hand      (2nd hand position)     Trial 1: 18    Trial 2: 21    Trial 3: 30    Right Wrist/Hand      (2nd hand position)     Trial 1: 40    Trial 2: 45    Trial 3: 48    Tests   Cervical   Positive vertical compression and cervical distraction test     Left   Positive Spurling's Test A and cervical flexion-rotation test       Right   Positive Spurling's Test A and cervical flexion-rotation test      Left Shoulder   Positive cervical rotation lateral flexion  Right Shoulder   Positive cervical rotation lateral flexion  Lumbar   Positive vertical compression        General Comments:    Upper quarter screen   Shoulder: unremarkable  Elbow: unremarkable  Hand/wrist: unremarkable             Precautions: COPD        Manuals 5/17 5/19 5/26 5/31 6/2 6/8 6/9 6/13 6/16 6/20   Neck, 15 15 15 10 15 15' 10 10 10 10   L UE nerve glides    5 5 4' 5 5     flossing     20x 20x       mech traction static c/s     13# 10' 13# 10' 14#  15#  16# 10' 17# 10'   Neuro Re-Ed                                                                                           HEP         2'    Ther Ex             UBE 2/2 3/3  3/3 3/3 3/3 3/3  3/3 3/3 3/3   Rows TB  Red 2x10 3x10 30x Blue 30x 30x red 30x 30x 30x 30x   shld ext TB  Red 2x10 3x10 30x 30x blue 30x red  30x 30x 30x 30x   Cane CP+   30x 2# 30x 1# 30x 1# 30x  2# 30x 2# 30x 2# 30x 3# 30x   Shld stab   30x 2# 30x 1# 30x  2# 30x 2# 30x 2# 30x 3# 30x   Posture rows    10x red 20x 20x red  20x 30x 30x 30x   Chin tucks    15x         Ball circles on wall L UE    30x ea 30x ea 30x ea 30x 30x 30x 30x   Bicep TB    Red 30x 30x blue 30x  30x 30x 30x 30x   tricep TB        30x red 30x 30x   IR RTC L         30x greeen 30x   ER RTC L         30x green 30x   Ther Activity                                       Gait Training                                       Modalities              8' 8' nt home         Ice after 8' deferred

## 2022-06-22 ENCOUNTER — APPOINTMENT (OUTPATIENT)
Dept: PHYSICAL THERAPY | Age: 47
End: 2022-06-22
Payer: COMMERCIAL

## 2022-06-23 ENCOUNTER — APPOINTMENT (OUTPATIENT)
Dept: PHYSICAL THERAPY | Age: 47
End: 2022-06-23
Payer: COMMERCIAL

## 2022-07-14 NOTE — PROGRESS NOTES
Patient was going for MRI on neck  She made 2 follow up appts at PT and has not returned or phone call  D/c PT at this time

## 2025-07-15 NOTE — PROGRESS NOTES
Daily Note     Today's date: 2022  Patient name: Puma Chávez  : 1975  MRN: [id number]  Referring provider: Jayla Davenport MD  Dx:   Encounter Diagnosis     ICD-10-CM    1  Cervical radiculitis  M54 12    2  Neck pain  M54 2                   Subjective: Pt reports that she is having moderate pain around the L side of her neck region 5/10  Notes that she has been compliant with her HEP and has been more aware of what movements she makes as well as posture  She also reported that she was in the garden the last few days weeding a lot  Objective: See treatment diary below      Assessment: Tolerated treatment fair  Increased LUE radicular symptoms present with L side bending PROM which then decreased with traction as well as with R side bending  Re educated pt on the importance of proper lifting mechanics and posture with compliance noted  Added in rows and ext to promote postural strengthening, updating her HEP  Patient demonstrated fatigue post treatment and would benefit from continued PT      Plan: Continue per plan of care        Precautions: COPD        Manuals            neck 15 15                                                  Neuro Re-Ed                                                                                                        Ther Ex             UBE 2/2 3/3           Rows TB  Red 2x10           shld ext TB  Red 2x10                                                                            Ther Activity                                       Gait Training                                       Modalities             MH 8' 8'           Ice after 8' deferred
1